# Patient Record
Sex: MALE | Race: WHITE | NOT HISPANIC OR LATINO | Employment: OTHER | URBAN - METROPOLITAN AREA
[De-identification: names, ages, dates, MRNs, and addresses within clinical notes are randomized per-mention and may not be internally consistent; named-entity substitution may affect disease eponyms.]

---

## 2017-05-28 ENCOUNTER — APPOINTMENT (OUTPATIENT)
Dept: RADIOLOGY | Facility: MEDICAL CENTER | Age: 82
End: 2017-05-28
Attending: EMERGENCY MEDICINE
Payer: MEDICARE

## 2017-05-28 ENCOUNTER — HOSPITAL ENCOUNTER (EMERGENCY)
Facility: MEDICAL CENTER | Age: 82
End: 2017-05-28
Attending: EMERGENCY MEDICINE
Payer: MEDICARE

## 2017-05-28 VITALS
BODY MASS INDEX: 23.14 KG/M2 | SYSTOLIC BLOOD PRESSURE: 144 MMHG | DIASTOLIC BLOOD PRESSURE: 82 MMHG | HEART RATE: 64 BPM | OXYGEN SATURATION: 93 % | TEMPERATURE: 98.2 F | HEIGHT: 66 IN | WEIGHT: 143.96 LBS | RESPIRATION RATE: 18 BRPM

## 2017-05-28 DIAGNOSIS — J18.9 PNEUMONIA OF RIGHT LOWER LOBE DUE TO INFECTIOUS ORGANISM: ICD-10-CM

## 2017-05-28 LAB
ALBUMIN SERPL BCP-MCNC: 3.5 G/DL (ref 3.2–4.9)
ALBUMIN/GLOB SERPL: 1.2 G/DL
ALP SERPL-CCNC: 53 U/L (ref 30–99)
ALT SERPL-CCNC: 19 U/L (ref 2–50)
ANION GAP SERPL CALC-SCNC: 5 MMOL/L (ref 0–11.9)
AST SERPL-CCNC: 19 U/L (ref 12–45)
BASOPHILS # BLD AUTO: 0.3 % (ref 0–1.8)
BASOPHILS # BLD: 0.04 K/UL (ref 0–0.12)
BILIRUB SERPL-MCNC: 0.7 MG/DL (ref 0.1–1.5)
BUN SERPL-MCNC: 12 MG/DL (ref 8–22)
CALCIUM SERPL-MCNC: 9 MG/DL (ref 8.4–10.2)
CHLORIDE SERPL-SCNC: 102 MMOL/L (ref 96–112)
CO2 SERPL-SCNC: 26 MMOL/L (ref 20–33)
CREAT SERPL-MCNC: 0.9 MG/DL (ref 0.5–1.4)
EKG IMPRESSION: NORMAL
EOSINOPHIL # BLD AUTO: 0.3 K/UL (ref 0–0.51)
EOSINOPHIL NFR BLD: 2.6 % (ref 0–6.9)
ERYTHROCYTE [DISTWIDTH] IN BLOOD BY AUTOMATED COUNT: 42.6 FL (ref 35.9–50)
GFR SERPL CREATININE-BSD FRML MDRD: >60 ML/MIN/1.73 M 2
GLOBULIN SER CALC-MCNC: 3 G/DL (ref 1.9–3.5)
GLUCOSE SERPL-MCNC: 93 MG/DL (ref 65–99)
HCT VFR BLD AUTO: 42.7 % (ref 42–52)
HGB BLD-MCNC: 15.1 G/DL (ref 14–18)
IMM GRANULOCYTES # BLD AUTO: 0.04 K/UL (ref 0–0.11)
IMM GRANULOCYTES NFR BLD AUTO: 0.3 % (ref 0–0.9)
LYMPHOCYTES # BLD AUTO: 1.76 K/UL (ref 1–4.8)
LYMPHOCYTES NFR BLD: 15.3 % (ref 22–41)
MCH RBC QN AUTO: 33.9 PG (ref 27–33)
MCHC RBC AUTO-ENTMCNC: 35.4 G/DL (ref 33.7–35.3)
MCV RBC AUTO: 96 FL (ref 81.4–97.8)
MONOCYTES # BLD AUTO: 1.16 K/UL (ref 0–0.85)
MONOCYTES NFR BLD AUTO: 10.1 % (ref 0–13.4)
NEUTROPHILS # BLD AUTO: 8.17 K/UL (ref 1.82–7.42)
NEUTROPHILS NFR BLD: 71.4 % (ref 44–72)
NRBC # BLD AUTO: 0 K/UL
NRBC BLD AUTO-RTO: 0 /100 WBC
PLATELET # BLD AUTO: 204 K/UL (ref 164–446)
PMV BLD AUTO: 9.9 FL (ref 9–12.9)
POTASSIUM SERPL-SCNC: 4.1 MMOL/L (ref 3.6–5.5)
PROT SERPL-MCNC: 6.5 G/DL (ref 6–8.2)
RBC # BLD AUTO: 4.45 M/UL (ref 4.7–6.1)
SODIUM SERPL-SCNC: 133 MMOL/L (ref 135–145)
WBC # BLD AUTO: 11.5 K/UL (ref 4.8–10.8)

## 2017-05-28 PROCEDURE — 80053 COMPREHEN METABOLIC PANEL: CPT

## 2017-05-28 PROCEDURE — 700111 HCHG RX REV CODE 636 W/ 250 OVERRIDE (IP): Performed by: EMERGENCY MEDICINE

## 2017-05-28 PROCEDURE — 36415 COLL VENOUS BLD VENIPUNCTURE: CPT

## 2017-05-28 PROCEDURE — 85025 COMPLETE CBC W/AUTO DIFF WBC: CPT

## 2017-05-28 PROCEDURE — 93005 ELECTROCARDIOGRAM TRACING: CPT

## 2017-05-28 PROCEDURE — 700101 HCHG RX REV CODE 250: Performed by: EMERGENCY MEDICINE

## 2017-05-28 PROCEDURE — 94760 N-INVAS EAR/PLS OXIMETRY 1: CPT

## 2017-05-28 PROCEDURE — 94640 AIRWAY INHALATION TREATMENT: CPT

## 2017-05-28 PROCEDURE — 87040 BLOOD CULTURE FOR BACTERIA: CPT

## 2017-05-28 PROCEDURE — 99284 EMERGENCY DEPT VISIT MOD MDM: CPT

## 2017-05-28 PROCEDURE — 96365 THER/PROPH/DIAG IV INF INIT: CPT

## 2017-05-28 PROCEDURE — 71020 DX-CHEST-2 VIEWS: CPT

## 2017-05-28 RX ORDER — ASPIRIN 81 MG/1
81 TABLET, CHEWABLE ORAL EVERY EVENING
COMMUNITY

## 2017-05-28 RX ORDER — LEVOTHYROXINE SODIUM 0.03 MG/1
25 TABLET ORAL
COMMUNITY

## 2017-05-28 RX ORDER — CEFTRIAXONE 1 G/1
1 INJECTION, POWDER, FOR SOLUTION INTRAMUSCULAR; INTRAVENOUS ONCE
Status: COMPLETED | OUTPATIENT
Start: 2017-05-28 | End: 2017-05-28

## 2017-05-28 RX ORDER — AZITHROMYCIN 250 MG/1
TABLET, FILM COATED ORAL
Qty: 6 TAB | Refills: 0 | Status: SHIPPED | OUTPATIENT
Start: 2017-05-28

## 2017-05-28 RX ORDER — LOVASTATIN 10 MG/1
10 TABLET ORAL NIGHTLY
COMMUNITY

## 2017-05-28 RX ORDER — ALBUTEROL SULFATE 90 UG/1
2 AEROSOL, METERED RESPIRATORY (INHALATION) EVERY 6 HOURS PRN
Qty: 8.5 G | Refills: 0 | Status: SHIPPED | OUTPATIENT
Start: 2017-05-28

## 2017-05-28 RX ADMIN — ALBUTEROL SULFATE 2.5 MG: 2.5 SOLUTION RESPIRATORY (INHALATION) at 13:42

## 2017-05-28 RX ADMIN — CEFTRIAXONE 1 G: 1 INJECTION, POWDER, FOR SOLUTION INTRAMUSCULAR; INTRAVENOUS at 14:17

## 2017-05-28 ASSESSMENT — PAIN SCALES - GENERAL: PAINLEVEL_OUTOF10: 0

## 2017-05-28 NOTE — ED AVS SNAPSHOT
[employer] Access Code: [email or web address]  Expires: 6/27/2017  3:08 PM    Your email address is not on file at [facility].  Email Addresses are required for you to sign up for [employer], please contact [phone] to verify your personal information and to provide your email address prior to attempting to register for [employer] [address]    [employer]  A secure, online tool to manage your health information     [facility]’s [employer] Innovative® is a secure, online tool that connects you to your personalized health information from the privacy of your home -- day or night - making it very easy for you to manage your healthcare. Once the activation process is completed, you can even access your medical information using the [employer] braeden, which is available for free in the Apple Braeden store or Google Play store.     To learn more about [employer], visit [email or web address]/Morris Innovative    There are two levels of access available (as shown below):   My Chart Features  [facility] Primary Care Doctor [facility]  Specialists [facility]  Urgent  Care Non-[facility] Primary Care Doctor   Email your healthcare team securely and privately 24/7 X X X    Manage appointments: schedule your next appointment; view details of past/upcoming appointments X      Request prescription refills. X      View recent personal medical records, including lab and immunizations X X X X   View health record, including health history, allergies, medications X X X X   Read reports about your outpatient visits, procedures, consult and ER notes X X X X   See your discharge summary, which is a recap of your hospital and/or ER visit that includes your diagnosis, lab results, and care plan X X  X     How to register for [employer]:  Once your e-mail address has been verified, follow the following steps to sign up for [employer].     1. Go to  [email or web address]  2. Click on the Sign Up Now box, which takes you to the New Member Sign Up page. You  will need to provide the following information:  a. Enter your [employer] Access Code exactly as it appears at the top of this page. (You will not need to use this code after you’ve completed the sign-up process. If you do not sign up before the expiration date, you must request a new code.)   b. Enter your date of birth.   c. Enter your home email address.   d. Click Submit, and follow the next screen’s instructions.  3. Create a [employer]iat ID. This will be your Brand[employer]ters login ID and cannot be changed, so think of one that is secure and easy to remember.  4. Create a [employer] password. You can change your password at any time.  5. Enter your Password Reset Question and Answer. This can be used at a later time if you forget your password.   6. Enter your e-mail address. This allows you to receive e-mail notifications when new information is available in [employer]ters.  7. Click Sign Up. You can now view your health information.    For assistance activating your [employer] account, call [phone]

## 2017-05-28 NOTE — FLOWSHEET NOTE
05/28/17 1345   Events/Summary/Plan   Events/Summary/Plan SVN in ER   Interdisciplinary Plan of Care-Outcomes    Bronchodilator Outcome Patient at Stable Baseline   Education   Education Yes - Pt. / Family has been Instructed in use of Respiratory Equipment   SVN Group   #SVN Performed Yes   Given By: Mask   Respiratory WDL   Respiratory (WDL) X   Chest Exam   Work Of Breathing / Effort Mild   Respiration 18  (post 18)   Pulse (!) 58  (post 60)   Heart Rate (Monitored) (!) 58   Breath Sounds   Pre/Post Intervention Pre Intervention Assessment  (increased aeration after svn)   RUL Breath Sounds Diminished   RML Breath Sounds Diminished   RLL Breath Sounds Diminished   CELESTINO Breath Sounds Diminished   LLL Breath Sounds Diminished   Oximetry   #Pulse Oximetry (Single Determination) Yes   Oxygen   Home O2 Use Prior To Admission? No   Pulse Oximetry 96 %   O2 Daily Delivery Respiratory  Room Air with O2 Available

## 2017-05-28 NOTE — ED AVS SNAPSHOT
5/28/2017    Chip Weiss  [address] [place] [address] [place] [address]    Dear Chip:    [facility] wants to ensure your discharge home is safe and you or your loved ones have had all of your questions answered regarding your care after you leave the hospital.    Below is a list of resources and contact information should you have any questions regarding your hospital stay, follow-up instructions, or active medical symptoms.    Questions or Concerns Regarding… Contact   Medical Questions Related to Your Discharge  (7 days a week, 8am-5pm) Contact a Nurse Care Coordinator   [phone]   Medical Questions Not Related to Your Discharge  (24 hours a day / 7 days a week)  Contact the Nurse Health Line   [phone]    Medications or Discharge Instructions Refer to your discharge packet   or contact your [facility] Primary Care Provider   [phone]   Follow-up Appointment(s) Schedule your appointment via HumanCloud   or contact Scheduling [phone]   Billing Review your statement via HumanCloud  or contact Billing [phone]   Medical Records Review your records via HumanCloud   or contact Medical Records [phone]     You may receive a telephone call within two days of discharge. This call is to make certain you understand your discharge instructions and have the opportunity to have any questions answered. You can also easily access your medical information, test results and upcoming appointments via the HumanCloud free online health management tool. You can learn more and sign up at [employer]/HumanCloud. For assistance setting up your HumanCloud account, please call [phone].    Once again, we want to ensure your discharge home is safe and that you have a clear understanding of any next steps in your care. If you have any questions or concerns, please do not hesitate to contact us, we are here for you. Thank you for choosing [facility] for your healthcare needs.    Sincerely,    Your [facility] Healthcare Team

## 2017-05-28 NOTE — ED NOTES
"Med rec updated and complete  Allergies reviewed  Pt states \"No antibiotics in the last 30 days\".  Pt would like to have a paper RX, will go to own pharmacy.    "

## 2017-05-28 NOTE — ED NOTES
Productive cough x 1 week. Congestion. Difficulty sleeping. No energy. Chest wall tightness that exacerbates coughing.

## 2017-05-28 NOTE — ED AVS SNAPSHOT
Home Care Instructions                                                                                                                Chip Weiss   MRN: [id number]    Department:  [facility], Emergency Dept   Date of Visit:  5/28/2017            [facility], Emergency Dept    [address]    [place] [address]    Phone:  [phone]      You were seen by     Amelia Gandhi M.D.      Your Diagnosis Was     Pneumonia of right lower lobe due to infectious organism     J18.1       These are the medications you received during your hospitalization from 05/28/2017 1020 to 05/28/2017 1514     Date/Time Order Dose Route Action    05/28/2017 1342 albuterol (PROVENTIL) 2.5mg/0.5ml nebulizer solution 2.5 mg 2.5 mg Nebulization Given    05/28/2017 1417 cefTRIAXone (ROCEPHIN) injection 1 g 1 g Intravenous Given      Follow-up Information     1. Follow up with [facility], Emergency Dept.    Specialty:  Emergency Medicine    Why:  Return for worsening difficulty breathing, cough or other concerns    Contact information    [address] [place] [address] [phone]        2. Follow up with primary care.    Why:  please follow-up with your primary care doctor next week for recheck. Your blood pressure was slightly elevated today please have this rechecked.      Medication Information     Review all of your home medications and newly ordered medications with your primary doctor and/or pharmacist as soon as possible. Follow medication instructions as directed by your doctor and/or pharmacist.     Please keep your complete medication list with you and share with your physician. Update the information when medications are discontinued, doses are changed, or new medications (including over-the-counter products) are added; and carry medication information at all times in the event of emergency situations.               Medication List         START taking these medications        Instructions    Morning Afternoon Evening Bedtime    albuterol 108 (90 BASE) MCG/ACT Aers inhalation aerosol        Inhale 2 Puffs by mouth every 6 hours as needed for Shortness of Breath.   Dose:  2 Puff                        azithromycin 250 MG Tabs   Commonly known as:  ZITHROMAX        Take as directed                          ASK your doctor about these medications        Instructions    Morning Afternoon Evening Bedtime    aspirin 81 MG Chew chewable tablet   Commonly known as:  ASA        Take 81 mg by mouth every evening.   Dose:  81 mg                        CO Q 10 PO        Take 1 Cap by mouth every day at 6 PM.   Dose:  1 Cap                        levothyroxine 25 MCG Tabs   Commonly known as:  SYNTHROID        Take 25 mcg by mouth Every morning on an empty stomach.   Dose:  25 mcg                        lovastatin 10 MG tablet   Commonly known as:  MEVACOR        Take 10 mg by mouth every evening.   Dose:  10 mg                        ROBITUSSIN COUGH/COLD D PO        Take 10 mL by mouth every four hours as needed (For cold symptoms).   Dose:  10 mL                        VITAMIN D PO        Take 1 Cap by mouth every day.   Dose:  1 Cap                             Where to Get Your Medications      You can get these medications from any pharmacy     Bring a paper prescription for each of these medications    - albuterol 108 (90 BASE) MCG/ACT Aers inhalation aerosol  - azithromycin 250 MG Tabs            Procedures and tests performed during your visit     Procedure/Test Number of Times Performed    BLOOD CULTURE 2    CBC WITH DIFFERENTIAL 1    COMP METABOLIC PANEL 1    DX-CHEST-2 VIEWS 1    EKG (ER) 1    ESTIMATED GFR 1    IV Saline Lock 1        Discharge Instructions       Pneumonia, Adult  Pneumonia is an infection of the lungs. It may be caused by a germ (virus or bacteria). Some types of pneumonia can spread easily from person to person. This can happen  when you cough or sneeze.  HOME CARE  · If you are losing too much sleep, you may use cough medicine. However, you should try to avoid taking cough medicine. This is because coughing helps remove mucus from your lungs.  · Sleep so you are almost sitting up. This may help you cough less.  · Use a vaporizer or humidifier in your home or bedroom. These things can help you breathe better.  · If you were given an antibiotic medicine, finish all of it even if you start to feel better.  · If you were told to take an expectorant medicine, use it as told by your doctor. This medicine brings up mucus from your lungs.  · Only take medicine as told by your doctor.  · Do not smoke. If you smoke, your cough may last weeks after your pneumonia gets better.  · Rest as needed.  A shot (vaccine) can help prevent pneumonia. Shots are often suggested for:  · People over 65 years old.  · People on chemotherapy.  · People with long-term (chronic) lung problems.  · People with immune system problems.  GET HELP IF:  · You have a fever.  · You cannot control your cough with medicine at night, and you are losing sleep.  GET HELP RIGHT AWAY IF:   · You have shortness of breath that gets worse.  · You have more chest pain.  · Your sickness gets worse. This is especially true if you are an older adult or have a weak immune system.  · You cough up blood.  · Your pain gets worse and medicines do not help.  · Your symptoms get worse and not better.     This information is not intended to replace advice given to you by your health care provider. Make sure you discuss any questions you have with your health care provider.     Document Released: 06/05/2009 Document Revised: 01/08/2016 Document Reviewed: 04/13/2016  [employer] Interactive Patient Education ©2016 [employer]            Patient Information     Patient Information    Following emergency treatment: all patient requiring follow-up care must return either to a private physician or a clinic  if your condition worsens before you are able to obtain further medical attention, please return to the emergency room.     Billing Information    At [facility], we work to make the billing process streamlined for our patients.  Our Representatives are here to answer any questions you may have regarding your hospital bill.  If you have insurance coverage and have supplied your insurance information to us, we will submit a claim to your insurer on your behalf.  Should you have any questions regarding your bill, we can be reached online or by phone as follows:  Online: You are able pay your bills online or live chat with our representatives about any billing questions you may have. We are here to help Monday - Friday from 8:00am to 7:30pm and 9:00am - 12:00pm on Saturdays.  Please visit [email or web address]  for more information.   Phone:  [phone] or [phone]    Please note that your emergency physician, surgeon, pathologist, radiologist, anesthesiologist, and other specialists are not employed by [facility] and will therefore bill separately for their services.  Please contact them directly for any questions concerning their bills at the numbers below:     Emergency Physician Services:  [phone]  [facility]:  [phone]  [facility]:  [phone]  [facility]:  [phone]    1. Your final bill may vary from the amount quoted upon discharge if all procedures are not complete at that time, or if your doctor has additional procedures of which we are not aware. You will receive an additional bill if you return to the Emergency Department at [facility] for suture removal regardless of the facility of which the sutures were placed.     2. Please arrange for settlement of this account at the emergency registration.    3. All self-pay accounts are due in full at the time of treatment.  If you are unable to meet this  obligation then payment is expected within 4-5 days.     4. If you have had radiology studies (CT, X-ray, Ultrasound, MRI), you have received a preliminary result during your emergency department visit. Please contact the radiology department [phone] to receive a copy of your final result. Please discuss the Final result with your primary physician or with the follow up physician provided.     Crisis Hotline:  [place] Crisis Hotline:  1-811-VSTTTEG or 1-531.803.7912  [place] Crisis Hotline:    1-218.242.7166 or 549-613-2402         ED Discharge Follow Up Questions    1. In order to provide you with very good care, we would like to follow up with a phone call in the next few days.  May we have your permission to contact you?     YES /  NO    2. What is the best phone number to call you? (       )_____-__________    3. What is the best time to call you?      Morning  /  Afternoon  /  Evening                   Patient Signature:  ____________________________________________________________    Date:  ____________________________________________________________

## 2017-05-28 NOTE — ED PROVIDER NOTES
"ED Provider Note    CHIEF COMPLAINT  Chief Complaint   Patient presents with   • Cough   • Congestion   • Chest Wall Pain       HPI  Chip Weiss is a 81 y.o. male who presents with a cough for the last week. It has gotten progressively worse he feels like there is some tightness in his chest that prevents him from breathing deeply. He has had a hard time sleeping secondary to this congestion. He cannot seem to loosen it up today get anything out. He recently got back from [place]. He has not had any fevers he has no other medical problems.      REVIEW OF SYSTEMS  Positive for cough congestion, negative for fevers. Other systems reviewed and are otherwise negative    PAST MEDICAL HISTORY   has a past medical history of High cholesterol and Thyroid disease.    SOCIAL HISTORY  Social History     Social History Main Topics   • Smoking status: Never Smoker    • Smokeless tobacco: Never Used   • Alcohol Use: Yes      Comment: occasional   • Drug Use: No   • Sexual Activity: Not on file       SURGICAL HISTORY  patient denies any surgical history    CURRENT MEDICATIONS  Home Medications     Reviewed by Byron Montes (Pharmacy Tech) on 05/28/17 at 1343  Med List Status: Complete    Medication Last Dose Status    aspirin (ASA) 81 MG Chew Tab chewable tablet 5/27/2017 Active    Cholecalciferol (VITAMIN D PO) 5/27/2017 Active    Coenzyme Q10 (CO Q 10 PO) 5/27/2017 Active    levothyroxine (SYNTHROID) 25 MCG Tab 5/27/2017 Active    lovastatin (MEVACOR) 10 MG tablet 5/27/2017 Active    Pseudoephedrine-DM-GG (ROBITUSSIN COUGH/COLD D PO) 5/28/2017 Active                ALLERGIES  No Known Allergies    PHYSICAL EXAM  VITAL SIGNS: /82 mmHg  Pulse 64  Temp(Src) 36.8 °C (98.2 °F)  Resp 18  Ht 1.676 m (5' 5.98\")  Wt 65.3 kg (143 lb 15.4 oz)  BMI 23.25 kg/m2  SpO2 93%  Constitutional: Alert in no apparent distress.  HENT: Normocephalic, Atraumatic, Bilateral external ears normal. Nose normal.   Eyes: Pupils are " equal and reactive. Conjunctiva normal, non-icteric.   Heart: Regular rate and rythm, no murmurs.    Lungs: Rales right lower lobe otherwise no wheezing  Skin: Warm, Dry, No erythema, No rash.   Neurologic: Alert, Grossly non-focal.   Psychiatric: Affect normal, Judgment normal, Mood normal, Appears appropriate and not intoxicated.   Extremities: Intact distal pulses, No edema, No tenderness    DIAGNOSTIC STUDIES / PROCEDURES    ekg  Twelve-lead EKG interpreted by myself please see below for details.    DX-CHEST-2 VIEWS   Final Result      1.  Right lower lobe linear opacity posteriorly which could be atelectasis versus pneumonitis.          Results for orders placed or performed during the hospital encounter of 05/28/17   CBC WITH DIFFERENTIAL   Result Value Ref Range    WBC 11.5 (H) 4.8 - 10.8 K/uL    RBC 4.45 (L) 4.70 - 6.10 M/uL    Hemoglobin 15.1 14.0 - 18.0 g/dL    Hematocrit 42.7 42.0 - 52.0 %    MCV 96.0 81.4 - 97.8 fL    MCH 33.9 (H) 27.0 - 33.0 pg    MCHC 35.4 (H) 33.7 - 35.3 g/dL    RDW 42.6 35.9 - 50.0 fL    Platelet Count 204 164 - 446 K/uL    MPV 9.9 9.0 - 12.9 fL    Neutrophils-Polys 71.40 44.00 - 72.00 %    Lymphocytes 15.30 (L) 22.00 - 41.00 %    Monocytes 10.10 0.00 - 13.40 %    Eosinophils 2.60 0.00 - 6.90 %    Basophils 0.30 0.00 - 1.80 %    Immature Granulocytes 0.30 0.00 - 0.90 %    Nucleated RBC 0.00 /100 WBC    Neutrophils (Absolute) 8.17 (H) 1.82 - 7.42 K/uL    Lymphs (Absolute) 1.76 1.00 - 4.80 K/uL    Monos (Absolute) 1.16 (H) 0.00 - 0.85 K/uL    Eos (Absolute) 0.30 0.00 - 0.51 K/uL    Baso (Absolute) 0.04 0.00 - 0.12 K/uL    Immature Granulocytes (abs) 0.04 0.00 - 0.11 K/uL    NRBC (Absolute) 0.00 K/uL   COMP METABOLIC PANEL   Result Value Ref Range    Sodium 133 (L) 135 - 145 mmol/L    Potassium 4.1 3.6 - 5.5 mmol/L    Chloride 102 96 - 112 mmol/L    Co2 26 20 - 33 mmol/L    Anion Gap 5.0 0.0 - 11.9    Glucose 93 65 - 99 mg/dL    Bun 12 8 - 22 mg/dL    Creatinine 0.90 0.50 - 1.40 mg/dL     Calcium 9.0 8.4 - 10.2 mg/dL    AST(SGOT) 19 12 - 45 U/L    ALT(SGPT) 19 2 - 50 U/L    Alkaline Phosphatase 53 30 - 99 U/L    Total Bilirubin 0.7 0.1 - 1.5 mg/dL    Albumin 3.5 3.2 - 4.9 g/dL    Total Protein 6.5 6.0 - 8.2 g/dL    Globulin 3.0 1.9 - 3.5 g/dL    A-G Ratio 1.2 g/dL   ESTIMATED GFR   Result Value Ref Range    GFR If African American >60 >60 mL/min/1.73 m 2    GFR If Non African American >60 >60 mL/min/1.73 m 2   EKG (ER)   Result Value Ref Range    Report       [facility] Emergency Dept.    Test Date:  2017  Pt Name:    SARA ROBERT              Department: [facility]  MRN:        [id number]                      Room:  Gender:                                 Technician: CAYETANO  :        [id number]                   Requested By:ER TRIAGE PROTOCOL  Order #:    [id number]                    Reading MD:    Measurements  Intervals                                Axis  Rate:       59                           P:          61  MD:         176                          QRS:        53  QRSD:       86                           T:          49  QT:         400  QTc:        397    Interpretive Statements  SINUS BRADYCARDIA  VENTRICULAR PREMATURE COMPLEX  INTERPOLATED VENTRICULAR PREMATURE COMPLEX  PROBABLE LEFT ATRIAL ABNORMALITY  No previous ECG available for comparison           COURSE & MEDICAL DECISION MAKING  Pertinent Labs & Imaging studies reviewed. (See chart for details)  This is a 81-year-old male who presents with a persistent cough. He is afebrile he has not tachycardic he is not toxic. His x-ray does show a right lower lobe pneumonia. EKG does not show any evidence of ischemia. Given his age and his pneumonia on x-ray labs were obtained. He has a an elevated white blood cell count normal electrolytes. He is given a dose of IV ceftriaxone. Again he has remained non-hypoxic afebrile not tachypneic I do think he can be treated as an outpatient. He will be given a Z-Rolly and he is  stable for discharge.     The patient will return for new or worsening symptoms and is stable at the time of discharge. Patient was given return precautions. Patient and/or family member verbalizes understanding and will comply.    DISPOSITION:  Patient will be discharged home in stable condition.    FOLLOW UP:  [facility], Emergency Dept  [address]  [place] [address]  [phone]    Return for worsening difficulty breathing, cough or other concerns    primary care      please follow-up with your primary care doctor next week for recheck. Your blood pressure was slightly elevated today please have this rechecked.        OUTPATIENT MEDICATIONS:  Discharge Medication List as of 5/28/2017  3:14 PM      START taking these medications    Details   azithromycin (ZITHROMAX) 250 MG Tab Take as directed, Disp-6 Tab, R-0, Print Rx Paper      albuterol 108 (90 BASE) MCG/ACT Aero Soln inhalation aerosol Inhale 2 Puffs by mouth every 6 hours as needed for Shortness of Breath., Disp-8.5 g, R-0, Print Rx Paper             FINAL IMPRESSION  1. Pneumonia of right lower lobe due to infectious organism        2.   3.     This dictation has been creating using voice recognition software. The accuracy of the dictation is limited the abilities of the software.  I expect there may be some errors of grammar and possibly content. I made every attempt to manually correct the errors within my dictation. However errors related to this voice recognition software may still exist and should be interpreted within the appropriate context.        The note accurately reflects work and decisions made by me.  Amelia Gandhi  5/28/2017  4:57 PM

## 2017-05-28 NOTE — ED NOTES
Pt discharged with written instructions. Pt verbalized understanding. Pt's AAOx4. Pt ambulated independently from ER.

## 2017-06-02 LAB
BACTERIA BLD CULT: NORMAL
BACTERIA BLD CULT: NORMAL
SIGNIFICANT IND 70042: NORMAL
SIGNIFICANT IND 70042: NORMAL
SITE SITE: NORMAL
SITE SITE: NORMAL
SOURCE SOURCE: NORMAL
SOURCE SOURCE: NORMAL